# Patient Record
Sex: FEMALE | URBAN - METROPOLITAN AREA
[De-identification: names, ages, dates, MRNs, and addresses within clinical notes are randomized per-mention and may not be internally consistent; named-entity substitution may affect disease eponyms.]

---

## 2021-07-01 ENCOUNTER — OFFICE VISIT (OUTPATIENT)
Dept: URBAN - METROPOLITAN AREA CLINIC 27 | Facility: CLINIC | Age: 21
End: 2021-07-01
Payer: COMMERCIAL

## 2021-07-01 DIAGNOSIS — H35.40 PERIPHERAL RETINAL DEGENERATION: Primary | ICD-10-CM

## 2021-07-01 PROCEDURE — 92004 COMPRE OPH EXAM NEW PT 1/>: CPT | Performed by: OPHTHALMOLOGY

## 2021-07-01 ASSESSMENT — INTRAOCULAR PRESSURE
OS: 12
OD: 12

## 2021-07-01 NOTE — IMPRESSION/PLAN
Impression: Peripheral retinal degeneration: H35.40. Bilateral. Plan: --peripheral myopic degeneration OU
--white-without-pressure OU
--no e/o retinal holes or tears OU
--findings d/w pt in detail --rec observation
--RDW discussed RTC PRN

## 2022-08-26 ENCOUNTER — OFFICE VISIT (OUTPATIENT)
Dept: URBAN - METROPOLITAN AREA CLINIC 7 | Facility: CLINIC | Age: 22
End: 2022-08-26
Payer: COMMERCIAL

## 2022-08-26 DIAGNOSIS — H35.40 UNSPECIFIED PERIPHERAL RETINAL DEGENERATION: Primary | ICD-10-CM

## 2022-08-26 PROCEDURE — 92014 COMPRE OPH EXAM EST PT 1/>: CPT | Performed by: OPHTHALMOLOGY

## 2022-08-26 ASSESSMENT — INTRAOCULAR PRESSURE
OD: 12
OS: 13

## 2022-08-26 NOTE — IMPRESSION/PLAN
Impression: Peripheral retinal degeneration: H35.40. Bilateral. Plan: --stable myopic degeneration OU
--stable white-without-pressure OU
--no e/o retinal holes or tears OU
--findings d/w pt in detail --rec continued observation
--RDW discussed RTC PRN